# Patient Record
Sex: FEMALE | Race: WHITE | Employment: FULL TIME | URBAN - METROPOLITAN AREA
[De-identification: names, ages, dates, MRNs, and addresses within clinical notes are randomized per-mention and may not be internally consistent; named-entity substitution may affect disease eponyms.]

---

## 2023-03-27 ENCOUNTER — OFFICE VISIT (OUTPATIENT)
Dept: OBGYN CLINIC | Age: 43
End: 2023-03-27
Payer: COMMERCIAL

## 2023-03-27 VITALS
SYSTOLIC BLOOD PRESSURE: 128 MMHG | DIASTOLIC BLOOD PRESSURE: 82 MMHG | WEIGHT: 255.6 LBS | BODY MASS INDEX: 40.12 KG/M2 | HEIGHT: 67 IN

## 2023-03-27 DIAGNOSIS — Z80.0 FAMILY HX OF COLON CANCER: ICD-10-CM

## 2023-03-27 DIAGNOSIS — R10.2 PELVIC PAIN: Primary | ICD-10-CM

## 2023-03-27 DIAGNOSIS — K21.00 GASTROESOPHAGEAL REFLUX DISEASE WITH ESOPHAGITIS WITHOUT HEMORRHAGE: ICD-10-CM

## 2023-03-27 DIAGNOSIS — Z12.31 SCREENING MAMMOGRAM FOR BREAST CANCER: ICD-10-CM

## 2023-03-27 DIAGNOSIS — N92.1 METRORRHAGIA: ICD-10-CM

## 2023-03-27 PROCEDURE — 99203 OFFICE O/P NEW LOW 30 MIN: CPT | Performed by: OBSTETRICS & GYNECOLOGY

## 2023-03-27 NOTE — PROGRESS NOTES
Ramón Estevez  is a 38 y.o. female, G0  Patient's last menstrual period was 2023 (approximate). , who is being seen for pelvic pain. Her mother went through menopause at age 44. Pt really does not like to go the doctor. Pt reports about 2 mos ago she started having a sharp shooting pain in pelvis. Pain is intermittent and random. Not related to period/bleeding. Reports after intercourse has the same pain that happens after orgasm. No pain during intercourse. Pt in same sex relationship. She has pressure and inability to make urine at times. She denies hx of uti. She denies change in bowels. Her mother has appendiceal cancer that spread to her bowel. She lived 2 years after dx. She was 56 at diagnosis. Pt has been having heart burn issues. No pelvic imaging. Negative STD testing 2022 per pt. Has not had pap smear in 20+yrs per partner. Has not started screening mammmograms yet. Periods are have been irregular coming q 19-20 days x 9mo. Had thyroid testing and other screening labs last April. HISTORY:    OB History          0    Para   0    Term   0       0    AB   0    Living   0         SAB   0    IAB   0    Ectopic   0    Molar   0    Multiple   0    Live Births   0              GYN History     Cysts on ovaries in past.  No abnl pap or stis. Sexual History:   Social History     Substance and Sexual Activity   Sexual Activity Yes    Partners: Female          has a past medical history of Anxiety and Pilonidal cyst. . Past Surgical History:   Procedure Laterality Date    ANKLE ARTHROSCOPY W/ OPEN REPAIR Bilateral     PILONIDAL CYST EXCISION      WISDOM TOOTH EXTRACTION         Her current meds are:  No current outpatient medications on file. Family hx:  pgm late 42's / pat aunt - late 42's and cousin in late 29's all with breast cancer.     Review of Systems  Neg except hpi     PHYSICAL EXAM:  /82   Ht 5' 7\" (1.702 m)   Wt 255 lb 9.6 oz (115.9 kg)   LMP

## 2023-04-06 DIAGNOSIS — Z12.31 SCREENING MAMMOGRAM FOR BREAST CANCER: ICD-10-CM

## 2023-04-19 DIAGNOSIS — R92.2 INCONCLUSIVE MAMMOGRAM: ICD-10-CM

## 2023-09-12 DIAGNOSIS — R92.8 ABNORMAL MAMMOGRAM: Primary | ICD-10-CM

## 2023-11-08 ENCOUNTER — HOSPITAL ENCOUNTER (OUTPATIENT)
Dept: MRI IMAGING | Age: 43
Discharge: HOME | End: 2023-11-11
Payer: COMMERCIAL

## 2023-11-08 DIAGNOSIS — R20.2 PARESTHESIA OF SADDLE AREA: ICD-10-CM

## 2023-11-08 DIAGNOSIS — M54.16 LUMBAR RADICULOPATHY: ICD-10-CM

## 2023-11-08 PROCEDURE — 72148 MRI LUMBAR SPINE W/O DYE: CPT

## 2023-11-27 ENCOUNTER — OFFICE VISIT (OUTPATIENT)
Dept: NEUROSURGERY | Age: 43
End: 2023-11-27
Payer: COMMERCIAL

## 2023-11-27 VITALS
HEIGHT: 67 IN | HEART RATE: 73 BPM | SYSTOLIC BLOOD PRESSURE: 134 MMHG | BODY MASS INDEX: 37.67 KG/M2 | TEMPERATURE: 96.8 F | WEIGHT: 240 LBS | DIASTOLIC BLOOD PRESSURE: 73 MMHG | OXYGEN SATURATION: 97 %

## 2023-11-27 DIAGNOSIS — E66.9 OBESITY (BMI 30-39.9): ICD-10-CM

## 2023-11-27 DIAGNOSIS — M51.36 DDD (DEGENERATIVE DISC DISEASE), LUMBAR: ICD-10-CM

## 2023-11-27 DIAGNOSIS — M51.26 HNP (HERNIATED NUCLEUS PULPOSUS), LUMBAR: ICD-10-CM

## 2023-11-27 DIAGNOSIS — M54.16 LUMBAR RADICULOPATHY: Primary | ICD-10-CM

## 2023-11-27 DIAGNOSIS — M48.061 LUMBAR FORAMINAL STENOSIS: ICD-10-CM

## 2023-11-27 PROBLEM — M51.369 DDD (DEGENERATIVE DISC DISEASE), LUMBAR: Status: ACTIVE | Noted: 2023-11-27

## 2023-11-27 PROCEDURE — 99204 OFFICE O/P NEW MOD 45 MIN: CPT | Performed by: NEUROLOGICAL SURGERY

## 2023-11-27 RX ORDER — CYCLOBENZAPRINE HCL 10 MG
10 TABLET ORAL 3 TIMES DAILY PRN
COMMUNITY
Start: 2023-11-08

## 2023-11-27 RX ORDER — ESOMEPRAZOLE MAGNESIUM 40 MG/1
CAPSULE, DELAYED RELEASE ORAL
COMMUNITY
Start: 2023-11-26

## 2023-11-27 RX ORDER — LORAZEPAM 0.5 MG/1
0.5 TABLET ORAL NIGHTLY
COMMUNITY
Start: 2023-09-10

## 2023-11-27 RX ORDER — ALPRAZOLAM 0.25 MG/1
1 TABLET ORAL DAILY PRN
COMMUNITY
Start: 2021-04-30 | End: 2023-11-27 | Stop reason: CLARIF

## 2023-11-27 RX ORDER — FAMOTIDINE 40 MG/1
TABLET, FILM COATED ORAL
COMMUNITY
Start: 2023-11-26

## 2023-11-27 RX ORDER — FLUOXETINE HYDROCHLORIDE 20 MG/1
CAPSULE ORAL
COMMUNITY
Start: 2023-11-25

## 2023-11-27 NOTE — PROGRESS NOTES
History of Present Illness    Patient Words: 41 y.o. This patient is a 41 y.o. female who presents today for neurosurgical consultation. Approximately 5 weeks ago she bent over to change the liner in a garbage bag and developed immediate pain in her back and left lower extremity which have persisted. Pain is worse with standing. She can only stand for 10 minutes before she has to sit down. No falls. No incontinence but she does have increased urinary frequency. No recent conservative treatment other than the hip injection for pain. MRI scan of lumbar spine was reviewed with the patient today and does show a left-sided disc protrusion L4-5 causing foraminal narrowing on the left. Degenerative disc disease and several of the discs are noted. Alignment is normal.  No central canal stenosis.     Past Medical History:   Diagnosis Date    Anxiety     Pilonidal cyst         No Known Allergies     Family History   Problem Relation Age of Onset    Endometriosis Mother     Depression Mother     Anxiety Disorder Mother     Thyroid Disease Mother     Cancer Mother         Appendicidal Carcinoma    Heart Disease Father     Endometriosis Sister     Heart Disease Maternal Grandmother     Diabetes Maternal Grandmother     Heart Disease Paternal Grandmother         Social History     Socioeconomic History    Marital status:      Spouse name: Not on file    Number of children: Not on file    Years of education: Not on file    Highest education level: Not on file   Occupational History    Not on file   Tobacco Use    Smoking status: Former     Types: Cigarettes    Smokeless tobacco: Never   Vaping Use    Vaping Use: Never used   Substance and Sexual Activity    Alcohol use: Yes    Drug use: Never    Sexual activity: Yes     Partners: Female   Other Topics Concern    Not on file   Social History Narrative    Not on file     Social Determinants of Health     Financial Resource Strain: Not on file   Food Insecurity:

## 2023-11-29 ENCOUNTER — TELEPHONE (OUTPATIENT)
Dept: NEUROSURGERY | Age: 43
End: 2023-11-29

## 2023-11-29 NOTE — TELEPHONE ENCOUNTER
Pt was seen on Monday 11.27.23. Pt has been referred to PT and PM. Return appt is 1.22.24. Pt would like to know if she could be out of work while going to these appts. She states she can not stand more than 10 mins at time and [employer] will not let her off for these appts. Please advise.

## 2024-01-22 ENCOUNTER — OFFICE VISIT (OUTPATIENT)
Dept: NEUROSURGERY | Age: 44
End: 2024-01-22
Payer: COMMERCIAL

## 2024-01-22 VITALS
WEIGHT: 240.6 LBS | TEMPERATURE: 98 F | OXYGEN SATURATION: 99 % | SYSTOLIC BLOOD PRESSURE: 133 MMHG | HEART RATE: 66 BPM | HEIGHT: 67 IN | DIASTOLIC BLOOD PRESSURE: 81 MMHG | BODY MASS INDEX: 37.76 KG/M2

## 2024-01-22 DIAGNOSIS — E66.9 OBESITY (BMI 30-39.9): ICD-10-CM

## 2024-01-22 DIAGNOSIS — M54.16 LUMBAR RADICULOPATHY: ICD-10-CM

## 2024-01-22 DIAGNOSIS — M51.26 HNP (HERNIATED NUCLEUS PULPOSUS), LUMBAR: Primary | ICD-10-CM

## 2024-01-22 DIAGNOSIS — M48.061 LUMBAR FORAMINAL STENOSIS: ICD-10-CM

## 2024-01-22 DIAGNOSIS — M51.36 DDD (DEGENERATIVE DISC DISEASE), LUMBAR: ICD-10-CM

## 2024-01-22 PROCEDURE — 99213 OFFICE O/P EST LOW 20 MIN: CPT | Performed by: NEUROLOGICAL SURGERY

## 2024-01-22 NOTE — PROGRESS NOTES
History of Present Illness    Patient Words: 43 y.o.     This patient is a 43 y.o. female who presents today for neurosurgical follow-up.  She completed 1 epidural steroid injection with some modest relief and is scheduled for a second 1 this week.  Unfortunately, no one from physical therapy and recontacted her she did not get to start therapy.  She describes pain while sitting on the commode and turning tying her shoes and walking.  The activities of daily living are hard for her at this time.  No bowel bladder dysfunction and no weakness.    Past Medical History:   Diagnosis Date    Anxiety     Pilonidal cyst         No Known Allergies     Family History   Problem Relation Age of Onset    Endometriosis Mother     Depression Mother     Anxiety Disorder Mother     Thyroid Disease Mother     Cancer Mother         Appendicidal Carcinoma    Heart Disease Father     Endometriosis Sister     Heart Disease Maternal Grandmother     Diabetes Maternal Grandmother     Heart Disease Paternal Grandmother         Social History     Socioeconomic History    Marital status:      Spouse name: Not on file    Number of children: Not on file    Years of education: Not on file    Highest education level: Not on file   Occupational History    Not on file   Tobacco Use    Smoking status: Former     Types: Cigarettes    Smokeless tobacco: Never   Vaping Use    Vaping Use: Never used   Substance and Sexual Activity    Alcohol use: Yes    Drug use: Never    Sexual activity: Yes     Partners: Female   Other Topics Concern    Not on file   Social History Narrative    Not on file     Social Determinants of Health     Financial Resource Strain: Not on file   Food Insecurity: Not on file   Transportation Needs: Not on file   Physical Activity: Not on file   Stress: Not on file   Social Connections: Not on file   Intimate Partner Violence: Not on file   Housing Stability: Not on file       Current Outpatient Medications   Medication Sig

## 2024-02-06 NOTE — PROGRESS NOTES
Appointments   Date Time Provider Department Center   2/8/2024  9:00 AM Terry Maguire MD [facility]   2/12/2024  8:00 AM Mckay Armendariz, PT [facility]   2/19/2024  8:00 AM Mckay Armendariz, PT [facility]   2/21/2024  8:00 AM Mckay Armendariz, PT [facility]   2/26/2024  8:45 AM Mckay Armendariz, PT [facility]   2/28/2024  8:00 AM Mckay Armendariz, PT [facility]

## 2024-02-06 NOTE — THERAPY EVALUATION
Post Session Pain Level:      /10  Past Medical History/Comorbidities:   Ms. Syed  has a past medical history of Anxiety and Pilonidal cyst.  Ms. Syed  has a past surgical history that includes Pilonidal cyst excision; Ankle arthroscopy w/ open repair (Bilateral); and [place] tooth extraction.  Social History/Living Environment:   Patient lives with their family    Prior Level of Function/Work/Activity:   Prior Level of Function: Independent      Learning:   Does the patient/guardian have any barriers to learning?: No barriers  Will there be a co-learner?: No  Does the co-learner have any barriers to learning?: No barriers  What is the preferred language of the patient/guardian?: English  What is the preferred language of the co-learner?: English  Is an  required?: No  How does the patient/guardian prefer to learn new concepts?: Listening; Demonstration  How does the co-learner prefer to learn new concepts?: Demonstration; Listening    Fall Risk Scale:   Solomon Total Score: 0        OBJECTIVE     NATURE OF CONDITION Date: 2/7/24 Date:    Highest level of pain 10    Lowest level of pain 6    Aggravating factors Forward flexion/standing prolonged times    Alleviating factors rest    Frequency of symptoms Everyday     Description of symptoms Sharp shooting/numbness    Location of tenderness LLE radicular       RANGE OF MOTION Date: 2/7/24 Date:      RIGHT LEFT RIGHT LEFT   Lumbar Flexion WNL    Lumbar Extension WNL    Lumbar Lateral Flexion WNL WNL     Lumbar Rotation WNL WNL     Hip Flexion WNL WNL     Hip Extension WNL WNL       STRENGTH Date: 2/7/24 Date:      RIGHT LEFT RIGHT LEFT   Hip Flexion 4-/5 3-/5     Hip Extension 4-/5 3-/5     Hip Abduction 4-/5 3-/5     Knee Extension 4-/5 3-/5     Knee Flexion 4-/5 3-/5     Ankle Dorsiflexion 4-/5 3-/5     Ankle Plantarflexion 4-/5 3-/5       NEURO SCREEN Date: 2/7/24 Date:    Dermatomes Diminished light touch LLE throughout     Reflexes WNL

## 2024-02-07 ENCOUNTER — HOSPITAL ENCOUNTER (OUTPATIENT)
Dept: PHYSICAL THERAPY | Age: 44
Setting detail: RECURRING SERIES
Discharge: HOME | End: 2024-02-10
Attending: NEUROLOGICAL SURGERY
Payer: COMMERCIAL

## 2024-02-07 DIAGNOSIS — M54.51 VERTEBROGENIC LOW BACK PAIN: ICD-10-CM

## 2024-02-07 DIAGNOSIS — M54.59 OTHER LOW BACK PAIN: Primary | ICD-10-CM

## 2024-02-07 PROCEDURE — 97162 PT EVAL MOD COMPLEX 30 MIN: CPT

## 2024-02-07 PROCEDURE — 97112 NEUROMUSCULAR REEDUCATION: CPT

## 2024-02-07 PROCEDURE — 97110 THERAPEUTIC EXERCISES: CPT

## 2024-02-08 ENCOUNTER — OFFICE VISIT (OUTPATIENT)
Dept: NEUROSURGERY | Age: 44
End: 2024-02-08
Payer: COMMERCIAL

## 2024-02-08 VITALS
DIASTOLIC BLOOD PRESSURE: 76 MMHG | OXYGEN SATURATION: 98 % | BODY MASS INDEX: 38.27 KG/M2 | HEART RATE: 67 BPM | TEMPERATURE: 96.8 F | SYSTOLIC BLOOD PRESSURE: 138 MMHG | HEIGHT: 67 IN | WEIGHT: 243.8 LBS

## 2024-02-08 DIAGNOSIS — M48.061 LUMBAR FORAMINAL STENOSIS: ICD-10-CM

## 2024-02-08 DIAGNOSIS — M51.26 HNP (HERNIATED NUCLEUS PULPOSUS), LUMBAR: Primary | ICD-10-CM

## 2024-02-08 DIAGNOSIS — M51.36 DDD (DEGENERATIVE DISC DISEASE), LUMBAR: ICD-10-CM

## 2024-02-08 DIAGNOSIS — E66.9 OBESITY (BMI 30-39.9): ICD-10-CM

## 2024-02-08 DIAGNOSIS — M54.16 LUMBAR RADICULOPATHY: ICD-10-CM

## 2024-02-08 PROCEDURE — 99213 OFFICE O/P EST LOW 20 MIN: CPT | Performed by: NEUROLOGICAL SURGERY

## 2024-02-08 NOTE — PROGRESS NOTES
examined at the 4-week interval here in the office to be certain that we are making progress.      JASON FNUEZ MD

## 2024-02-12 ENCOUNTER — HOSPITAL ENCOUNTER (OUTPATIENT)
Dept: PHYSICAL THERAPY | Age: 44
Setting detail: RECURRING SERIES
Discharge: HOME | End: 2024-02-15
Attending: NEUROLOGICAL SURGERY
Payer: COMMERCIAL

## 2024-02-12 PROCEDURE — 97112 NEUROMUSCULAR REEDUCATION: CPT

## 2024-02-12 PROCEDURE — 97110 THERAPEUTIC EXERCISES: CPT

## 2024-02-12 NOTE — PROGRESS NOTES
Angelina Syed  : 1980  Primary: Junior Yanez (Niesha SCHREIBER)  Secondary:  [facility] @ [place] [address]  [place] [address]  Phone: [phone]  Fax: [phone] Plan Frequency: 2x a week for up to 90 days  Plan of Care/Certification Expiration Date: 24        Plan of Care/Certification Expiration Date:  Plan of Care/Certification Expiration Date: 24    Frequency/Duration: Plan Frequency: 2x a week for up to 90 days      Time In/Out:   Time In: 0800  Time Out: 0840      PT Visit Info:    Total # of Visits to Date: 2  Progress Note Counter: 2      Visit Count:  2    OUTPATIENT PHYSICAL THERAPY:   Treatment Note 2024       Episode  (LBP)               Treatment Diagnosis:    Other low back pain  Vertebrogenic low back pain  Medical/Referring Diagnosis:    HNP (herniated nucleus pulposus), lumbar  DDD (degenerative disc disease), lumbar  Obesity (BMI 30-39.9)  Lumbar radiculopathy  Lumbar foraminal stenosis      Referring Physician:  Terry Maguire MD MD Orders:  PT Eval and Treat   Return MD Appt:     Date of Onset:  No data recorded   Allergies:   Patient has no known allergies.  Restrictions/Precautions:   None      Interventions Planned (Treatment may consist of any combination of the following):     See Assessment Note    Subjective Comments:   Pt reports more radicular pain on the LLE ankle/foot      Initial Pain Level::      /10  Post Session Pain Level:        /10  Medications Last Reviewed:  2024  Updated Objective Findings:  See Evaluation Note from today  Treatment   THERAPEUTIC EXERCISE: (12 minutes):    Exercises per grid below to improve mobility, strength, balance and coordination.  Required no visual, verbal, manual and tactile cues to promote proper body alignment, promote proper body posture, promote proper body mechanics and promote proper body breathing techniques.  Progressed resistance, range, repetitions and complexity of

## 2024-02-19 ENCOUNTER — APPOINTMENT (OUTPATIENT)
Dept: PHYSICAL THERAPY | Age: 44
End: 2024-02-19
Attending: NEUROLOGICAL SURGERY
Payer: COMMERCIAL

## 2024-02-21 ENCOUNTER — HOSPITAL ENCOUNTER (OUTPATIENT)
Dept: PHYSICAL THERAPY | Age: 44
Setting detail: RECURRING SERIES
Discharge: HOME | End: 2024-02-24
Attending: NEUROLOGICAL SURGERY
Payer: COMMERCIAL

## 2024-02-21 PROCEDURE — 97110 THERAPEUTIC EXERCISES: CPT

## 2024-02-21 PROCEDURE — 97112 NEUROMUSCULAR REEDUCATION: CPT

## 2024-02-21 NOTE — PROGRESS NOTES
Angelina Syed  : 1980  Primary: Junior Yanez (Niesha SCHREIBER)  Secondary:  [facility] @ [place] [address]  [place] [address]  Phone: [phone]  Fax: [phone] Plan Frequency: 2x a week for up to 90 days  Plan of Care/Certification Expiration Date: 24        Plan of Care/Certification Expiration Date:  Plan of Care/Certification Expiration Date: 24    Frequency/Duration: Plan Frequency: 2x a week for up to 90 days      Time In/Out:   Time In: 0801  Time Out: 0841      PT Visit Info:    Total # of Visits to Date: 3  Progress Note Counter: 3      Visit Count:  3    OUTPATIENT PHYSICAL THERAPY:   Treatment Note 2024       Episode  (LBP)               Treatment Diagnosis:    Other low back pain  Vertebrogenic low back pain  Medical/Referring Diagnosis:    HNP (herniated nucleus pulposus), lumbar  DDD (degenerative disc disease), lumbar  Obesity (BMI 30-39.9)  Lumbar radiculopathy  Lumbar foraminal stenosis      Referring Physician:  Terry Maguire MD MD Orders:  PT Eval and Treat   Return MD Appt:     Date of Onset:  No data recorded   Allergies:   Patient has no known allergies.  Restrictions/Precautions:   None      Interventions Planned (Treatment may consist of any combination of the following):     See Assessment Note    Subjective Comments:   Pt reports more radicular down the LLE some slight improvement    Initial Pain Level::      /10  Post Session Pain Level:        /10  Medications Last Reviewed:  2024  Updated Objective Findings:  See Evaluation Note from today  Treatment   THERAPEUTIC EXERCISE: (12 minutes):    Exercises per grid below to improve mobility, strength, balance and coordination.  Required no visual, verbal, manual and tactile cues to promote proper body alignment, promote proper body posture, promote proper body mechanics and promote proper body breathing techniques.  Progressed resistance, range, repetitions and

## 2024-02-28 ENCOUNTER — HOSPITAL ENCOUNTER (OUTPATIENT)
Dept: PHYSICAL THERAPY | Age: 44
Setting detail: RECURRING SERIES
Discharge: HOME | End: 2024-03-02
Attending: NEUROLOGICAL SURGERY
Payer: COMMERCIAL

## 2024-02-28 PROCEDURE — 97110 THERAPEUTIC EXERCISES: CPT

## 2024-02-28 PROCEDURE — 97112 NEUROMUSCULAR REEDUCATION: CPT

## 2024-02-28 NOTE — PROGRESS NOTES
Angelina Syed  : 1980  Primary: Junior Yanez (Niesha SCHREIBER)  Secondary:  [facility] @ [place] [address]  [place] [address]  Phone: [phone]  Fax: [phone] Plan Frequency: 2x a week for up to 90 days  Plan of Care/Certification Expiration Date: 24        Plan of Care/Certification Expiration Date:  Plan of Care/Certification Expiration Date: 24    Frequency/Duration: Plan Frequency: 2x a week for up to 90 days      Time In/Out:   Time In: 0800  Time Out: 0842      PT Visit Info:    Total # of Visits to Date: 4  Progress Note Counter: 4  No Show: 1      Visit Count:  4    OUTPATIENT PHYSICAL THERAPY:   Treatment Note 2024       Episode  (LBP)               Treatment Diagnosis:    Other low back pain  Vertebrogenic low back pain  Medical/Referring Diagnosis:    HNP (herniated nucleus pulposus), lumbar  DDD (degenerative disc disease), lumbar  Obesity (BMI 30-39.9)  Lumbar radiculopathy  Lumbar foraminal stenosis      Referring Physician:  Terry Maguire MD MD Orders:  PT Eval and Treat   Return MD Appt:     Date of Onset:  No data recorded   Allergies:   Patient has no known allergies.  Restrictions/Precautions:   None      Interventions Planned (Treatment may consist of any combination of the following):     See Assessment Note    Subjective Comments:   Pt reports having an emergency allergic reaction event earlier this week which made some of her pain worse    Initial Pain Level::      /10  Post Session Pain Level:        /10  Medications Last Reviewed:  2024  Updated Objective Findings:  See Evaluation Note from today  Treatment   THERAPEUTIC EXERCISE: (12 minutes):    Exercises per grid below to improve mobility, strength, balance and coordination.  Required no visual, verbal, manual and tactile cues to promote proper body alignment, promote proper body posture, promote proper body mechanics and promote proper body breathing

## 2024-03-06 ENCOUNTER — TELEPHONE (OUTPATIENT)
Dept: NEUROSURGERY | Age: 44
End: 2024-03-06

## 2024-03-07 ENCOUNTER — OFFICE VISIT (OUTPATIENT)
Dept: NEUROSURGERY | Age: 44
End: 2024-03-07
Payer: COMMERCIAL

## 2024-03-07 VITALS
TEMPERATURE: 97.2 F | WEIGHT: 250.2 LBS | DIASTOLIC BLOOD PRESSURE: 80 MMHG | HEART RATE: 65 BPM | HEIGHT: 67 IN | OXYGEN SATURATION: 96 % | BODY MASS INDEX: 39.27 KG/M2 | SYSTOLIC BLOOD PRESSURE: 135 MMHG

## 2024-03-07 DIAGNOSIS — M51.36 DDD (DEGENERATIVE DISC DISEASE), LUMBAR: Primary | ICD-10-CM

## 2024-03-07 DIAGNOSIS — M48.061 LUMBAR FORAMINAL STENOSIS: ICD-10-CM

## 2024-03-07 DIAGNOSIS — M54.16 LUMBAR RADICULOPATHY: ICD-10-CM

## 2024-03-07 DIAGNOSIS — M51.26 HNP (HERNIATED NUCLEUS PULPOSUS), LUMBAR: ICD-10-CM

## 2024-03-07 PROCEDURE — 99213 OFFICE O/P EST LOW 20 MIN: CPT | Performed by: NEUROLOGICAL SURGERY

## 2024-03-07 RX ORDER — GABAPENTIN 300 MG/1
300 CAPSULE ORAL 3 TIMES DAILY
COMMUNITY
Start: 2024-02-19

## 2024-03-07 NOTE — PROGRESS NOTES
by mouth 3 times daily.      Fluticasone Propionate (FLONASE NA) by Nasal route      esomeprazole (NEXIUM) 40 MG delayed release capsule       famotidine (PEPCID) 40 MG tablet       FLUoxetine (PROZAC) 20 MG capsule       LORazepam (ATIVAN) 0.5 MG tablet Take 1 tablet by mouth nightly. at bedtime.       No current facility-administered medications for this visit.       Patient Active Problem List   Diagnosis    Obesity (BMI 30-39.9)    Lumbar radiculopathy    HNP (herniated nucleus pulposus), lumbar    DDD (degenerative disc disease), lumbar    Lumbar foraminal stenosis        Review of Systems: A complete ROS was done and as stated in the HPI or otherwise negative.    /80   Pulse 65   Temp 97.2 °F (36.2 °C) (Temporal)   Ht 1.702 m (5' 7\")   Wt 113.5 kg (250 lb 3.2 oz)   SpO2 96%   BMI 39.19 kg/m²        Physical Exam  The physical exam findings are as follows:      Cranial Nerves:   Intact visual fields. EMIL, EOM's full, no nystagmus, no ptosis. Facial sensation is normal. Corneal reflexes are intact. Facial movement is symmetric. Hearing is normal bilaterally. Palate is midline with normal sternocleidomastoid and trapezius muscles are normal. Tongue is midline.   Motor:  5/5 strength in upper and lower proximal and distal muscles.  Except 4/5 left EHL weakness normal bulk and tone. No fasciculations.   Reflexes:   Deep tendon reflexes 2+/4 and symmetrical.   Sensory:   Normal to touch, pinprick    Gait:  Normal gait.  Antalgic   Tremor:   No tremor noted.   Cerebellar:  No cerebellar signs present.              Assessment & Plan      ICD-10-CM    1. DDD (degenerative disc disease), lumbar  M51.36       2. HNP (herniated nucleus pulposus), lumbar  M51.26       3. Lumbar foraminal stenosis  M48.061       4. Lumbar radiculopathy  M54.16          Currently we are making progress and we need to stay the course.  Continue physical therapy for 2 times a week for 6 weeks and follow-up in 6 weeks for

## 2024-03-13 ENCOUNTER — HOSPITAL ENCOUNTER (OUTPATIENT)
Dept: PHYSICAL THERAPY | Age: 44
Setting detail: RECURRING SERIES
Discharge: HOME | End: 2024-03-16
Attending: NEUROLOGICAL SURGERY
Payer: COMMERCIAL

## 2024-03-13 PROCEDURE — 97110 THERAPEUTIC EXERCISES: CPT

## 2024-03-13 PROCEDURE — 97112 NEUROMUSCULAR REEDUCATION: CPT

## 2024-03-13 NOTE — PROGRESS NOTES
throughout AROM 10 x 10 sec holds, cues for posterior pelvic tilt throughout AROM     Clamshells cues for BLE hip ER with forward stacking   X 30 GTB X 30 GTB X 30 GTB X 30 GTB   Supine lateral distraction grades 2-3   3 mins  3 mins  3 mins  3 mins    Prone long axis distraction, grades 2-3, with lumbosacral belt    3 mins  3 mins  3 mins    Supine low intensity low duration holds with BP cuff sustained pressure 40 mmHg, posterior pelvic tilt    10 x 10 sec holds at 35-45mm Hg  10 x 10 sec holds at 35-45mm Hg  10 x 10 sec holds at 35-45mm Hg       Treatment/Session Summary:    Treatment Assessment:   Pt tolerated tx well, pt still presents with lumbar radicular pain that does LLE. Pt presents with more LLE weakness. Pt did respond well to long axis distraction, which does reduce her s/s. Will progress     Communication/Consultation:  None today  Equipment provided today:  None  Recommendations/Intent for next treatment session: Next visit will focus on neutral spine posture strength, repeated lumbar extension education and BLE strengthening .    >Total Treatment Billable Duration:  41 minutes   Time In: 0845  Time Out: 0928    Mckay Armendariz PT         Charge Capture  [employer] Portal  Appt Desk     Future Appointments   Date Time Provider Department Center   3/18/2024  9:30 AM Mckay Armendariz, PT [facility]   4/9/2024  8:45 AM Mckay Armendariz, PT [facility]   4/11/2024  8:45 AM Mckay Armendariz, PT [facility]   4/18/2024  8:45 AM Terry Maguire MD [facility]

## 2024-03-20 ENCOUNTER — APPOINTMENT (OUTPATIENT)
Dept: PHYSICAL THERAPY | Age: 44
End: 2024-03-20
Attending: NEUROLOGICAL SURGERY
Payer: COMMERCIAL

## 2024-04-11 ENCOUNTER — HOSPITAL ENCOUNTER (OUTPATIENT)
Dept: PHYSICAL THERAPY | Age: 44
Setting detail: RECURRING SERIES
Discharge: HOME | End: 2024-04-14
Attending: NEUROLOGICAL SURGERY
Payer: COMMERCIAL

## 2024-04-11 ENCOUNTER — TELEPHONE (OUTPATIENT)
Dept: NEUROSURGERY | Age: 44
End: 2024-04-11

## 2024-04-11 PROCEDURE — 97112 NEUROMUSCULAR REEDUCATION: CPT

## 2024-04-11 PROCEDURE — 97110 THERAPEUTIC EXERCISES: CPT

## 2024-04-11 ASSESSMENT — PAIN SCALES - GENERAL: PAINLEVEL_OUTOF10: 6

## 2024-04-11 NOTE — PROGRESS NOTES
distraction grades 2-3  3 mins  3 mins  3 mins    Prone long axis distraction, grades 2-3, with lumbosacral belt  3 mins  3 mins  2 mins    Supine low intensity low duration holds with BP cuff sustained pressure 40 mmHg, posterior pelvic tilt  10 x 10 sec holds at 35-45mm Hg  10 x 10 sec holds at 35-45mm Hg 10 x 10 sec holds at 35-45mm Hg   SLR Hamstring stretch    2 x 60 secs holds    Supine figure 4 stretch    2 x 60 secs    Supine iron cross stretch    X 30 BLE        Treatment/Session Summary:    Treatment Assessment:   Pt tolerated tx fairly well, pt does present with incessant LLE radicular pain. Upon PT special tests, pt looks to have more SIJ involvement than true lumbar pathology due to the ineffectiveness of positional relief and movement. Manual palpation reciprocated positive long dorsal sacral ligament testing, piriformis testing and long axis distraction relief was positive. Pt sees MD Maguire for follow up, pt was educated to discuss with MD about other involvement such as SIJ dysfunction or pirformis pain syndrome. Will progress prn     Communication/Consultation:  None today  Equipment provided today:  None  Recommendations/Intent for next treatment session: Next visit will focus on neutral spine posture strength, repeated lumbar extension education and BLE strengthening .    >Total Treatment Billable Duration:  42 minutes   Time In: 0845  Time Out: 0929    Mckay Armendariz PT         Charge Capture  [employer] Portal  Appt Desk     Future Appointments   Date Time Provider Department Center   4/23/2024  9:00 AM eTrry Maguire MD [facility]   4/24/2024  9:30 AM Mckay Armendariz, PT [facility]   5/1/2024  9:30 AM Mckay Armendariz PT [facility]

## 2024-04-11 NOTE — TELEPHONE ENCOUNTER
LVM to let pt know we have rx appt to 4/23 and this also may be subject to change. Left office number if this did not work for pt.

## 2024-04-18 ENCOUNTER — TELEPHONE (OUTPATIENT)
Dept: NEUROSURGERY | Age: 44
End: 2024-04-18

## 2024-04-18 NOTE — TELEPHONE ENCOUNTER
Dr. Maguire will be out of the office for the next several weeks.     Patient's appt has been rescheduled from 4/22 to 5/9 @ 2:30 with Dr. Maguire.    Detailed vm left for patient notifying of the above.

## 2024-04-24 ENCOUNTER — APPOINTMENT (OUTPATIENT)
Dept: PHYSICAL THERAPY | Age: 44
End: 2024-04-24
Attending: NEUROLOGICAL SURGERY
Payer: COMMERCIAL

## 2024-06-24 ENCOUNTER — OFFICE VISIT (OUTPATIENT)
Dept: NEUROSURGERY | Age: 44
End: 2024-06-24
Payer: COMMERCIAL

## 2024-06-24 VITALS
BODY MASS INDEX: 41.47 KG/M2 | TEMPERATURE: 97.5 F | OXYGEN SATURATION: 95 % | WEIGHT: 264.2 LBS | SYSTOLIC BLOOD PRESSURE: 127 MMHG | HEART RATE: 70 BPM | HEIGHT: 67 IN | DIASTOLIC BLOOD PRESSURE: 77 MMHG

## 2024-06-24 DIAGNOSIS — M51.36 DDD (DEGENERATIVE DISC DISEASE), LUMBAR: ICD-10-CM

## 2024-06-24 DIAGNOSIS — M54.16 LUMBAR RADICULOPATHY: Primary | ICD-10-CM

## 2024-06-24 DIAGNOSIS — M48.061 LUMBAR FORAMINAL STENOSIS: ICD-10-CM

## 2024-06-24 PROCEDURE — 99213 OFFICE O/P EST LOW 20 MIN: CPT | Performed by: NURSE PRACTITIONER

## 2024-06-24 RX ORDER — GABAPENTIN 300 MG/1
300 CAPSULE ORAL 3 TIMES DAILY
Qty: 90 CAPSULE | Refills: 5 | Status: SHIPPED | OUTPATIENT
Start: 2024-06-24 | End: 2024-07-24

## 2024-06-24 NOTE — PROGRESS NOTES
[facility] NOTE:   History of Present Illness:    CC: Follow-up after physical therapy    Angelina Syed is a 44 y.o. female here to follow-up after completing her lumbar physical therapy.  Patient has previously seen Dr. Maguire concerning her left leg radicular symptoms.  The lumbar MRI reveals a left L4-5 disc bulge causing foraminal stenosis and nerve impingement.  Patient had an epidural steroid injection that actually ended up making her symptoms much more intense.  Patient experienced some symptom improvement with physical therapy but states that she still has a lot of pain that is interfering with her life.  Patient states that she is unable to stand for long periods of time without pain.  Patient states she feels a lot of pain in the outer left hip that radiates down the leg.  Patient states she is unable to do stairs without extreme pain and she gets shooting pains in the hip.  Patient states she also notices that she walks with a limp.  Her pain does seem to be worse at night.  Patient states she is currently taking gabapentin 300 mg 3 times daily to try and help with her symptoms.    Past Medical History:   Diagnosis Date    Anxiety     Pilonidal cyst       Past Surgical History:   Procedure Laterality Date    ANKLE ARTHROSCOPY W/ OPEN REPAIR Bilateral     PILONIDAL CYST EXCISION      WISDOM TOOTH EXTRACTION       No Known Allergies   Family History   Problem Relation Age of Onset    Endometriosis Mother     Depression Mother     Anxiety Disorder Mother     Thyroid Disease Mother     Cancer Mother         Appendicidal Carcinoma    Heart Disease Father     Endometriosis Sister     Heart Disease Maternal Grandmother     Diabetes Maternal Grandmother     Heart Disease Paternal Grandmother       Social History     Socioeconomic History    Marital status:      Spouse name: Not on file    Number of children: Not on file    Years of education: Not on file    Highest

## 2024-07-02 ENCOUNTER — TELEPHONE (OUTPATIENT)
Dept: NEUROSURGERY | Age: 44
End: 2024-07-02

## 2024-07-02 NOTE — TELEPHONE ENCOUNTER
Advised patient it might take up to 2 days to obtain faxed [employer] paper work thru our [employer] system

## 2024-07-24 ENCOUNTER — OFFICE VISIT (OUTPATIENT)
Dept: NEUROSURGERY | Age: 44
End: 2024-07-24
Payer: COMMERCIAL

## 2024-07-24 VITALS
BODY MASS INDEX: 41.04 KG/M2 | HEART RATE: 72 BPM | TEMPERATURE: 97.3 F | OXYGEN SATURATION: 95 % | DIASTOLIC BLOOD PRESSURE: 74 MMHG | SYSTOLIC BLOOD PRESSURE: 126 MMHG | WEIGHT: 262 LBS

## 2024-07-24 DIAGNOSIS — M54.16 LUMBAR RADICULOPATHY: Primary | ICD-10-CM

## 2024-07-24 DIAGNOSIS — M48.061 STENOSIS OF LATERAL RECESS OF LUMBAR SPINE: ICD-10-CM

## 2024-07-24 DIAGNOSIS — F17.200 SMOKING: ICD-10-CM

## 2024-07-24 DIAGNOSIS — M51.26 HNP (HERNIATED NUCLEUS PULPOSUS), LUMBAR: ICD-10-CM

## 2024-07-24 PROCEDURE — 99214 OFFICE O/P EST MOD 30 MIN: CPT | Performed by: NEUROLOGICAL SURGERY

## 2024-07-24 NOTE — PROGRESS NOTES
[facility] CLINIC NOTE:   History of Present Illness:    CC: Left lower extremity pain, left lower extremity intermittent numbness and tingling    Angelina Syed is a 44 y.o. female who presents today for evaluation of left lower extremity pain, left lower extremity intermittent numbness and tingling.  The patient has had left lower extremity pain with left lower extremity associated intermittent weakness and intermittent numbness and tingling since November of this year.  She has completed 2 months worth of physical therapy.  She has completed 2 epidural steroid injections with really only receiving approximately 45 minutes worth of pain relief from the epidural steroid injections.  She has undergone 2 months worth of physical therapy.  She has treated her pain with antiepileptics such as Lyrica.  She has previously been evaluated by Dr. Maguire and GANESH Francis.  She states the pain is exacerbated with standing or walking for even up to 15 minutes and fears the pain radiating down her leg the back of the leg and sometimes the dorsum of the foot.  She is accompanied to today's visit by her wife.  She is an everyday smoker and was able to quit prior to the onset of the pain but has resumed smoking.  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 Terry Maguire.  The patient has an MRI lumbar spine without contrast performed at [facility] on 11/8/2023 that demonstrates loss of normal lumbar lordosis with straightening of the lumbar spine at L4-L5 there is a disc herniation that results in subarticular recess compression of the transiting nerve root.  There is no significant neuroforaminal stenosis.  There is some mild facet arthropathy at L5-S1 there is a central disc protrusion that is minimal without any significant compression of the transiting or central nerve roots.  There is

## 2024-07-24 NOTE — H&P (VIEW-ONLY)
opportunity to ask any questions regarding the surgical procedure. The patient and family members present expressed understanding and agree to proceed with surgery.      ICD-10-CM    1. Lumbar radiculopathy  M54.16       2. Stenosis of lateral recess of lumbar spine  M48.061       3. HNP (herniated nucleus pulposus), lumbar  M51.26       4. BMI 40.0-44.9, adult (HCC)  Z68.41       5. Smoking  F17.200           Xavier Chen MD, FAANS, FCNS  Neurosurgeon  [facility]  [facility]     Notes are transcribed with [employer], a medical voice recording dictation service, and may contain minor errors.

## 2024-07-29 ENCOUNTER — PREP FOR PROCEDURE (OUTPATIENT)
Dept: NEUROSURGERY | Age: 44
End: 2024-07-29

## 2024-07-29 DIAGNOSIS — M48.061 STENOSIS OF LATERAL RECESS OF LUMBAR SPINE: ICD-10-CM

## 2024-07-29 DIAGNOSIS — M51.26 HERNIATED NUCLEUS PULPOSUS, LUMBAR: ICD-10-CM

## 2024-08-01 ENCOUNTER — HOSPITAL ENCOUNTER (OUTPATIENT)
Dept: SURGERY | Age: 44
Discharge: HOME | End: 2024-08-01
Payer: COMMERCIAL

## 2024-08-01 VITALS
OXYGEN SATURATION: 94 % | HEART RATE: 62 BPM | RESPIRATION RATE: 16 BRPM | HEIGHT: 67 IN | BODY MASS INDEX: 41.26 KG/M2 | SYSTOLIC BLOOD PRESSURE: 122 MMHG | TEMPERATURE: 97.8 F | WEIGHT: 262.9 LBS | DIASTOLIC BLOOD PRESSURE: 69 MMHG

## 2024-08-01 LAB
MRSA DNA SPEC QL NAA+PROBE: NOT DETECTED
S AUREUS CPE NOSE QL NAA+PROBE: NOT DETECTED

## 2024-08-01 PROCEDURE — 87641 MR-STAPH DNA AMP PROBE: CPT

## 2024-08-01 ASSESSMENT — PAIN DESCRIPTION - ORIENTATION: ORIENTATION: LEFT

## 2024-08-01 ASSESSMENT — PAIN DESCRIPTION - LOCATION: LOCATION: BACK;LEG

## 2024-08-01 ASSESSMENT — PAIN DESCRIPTION - DESCRIPTORS: DESCRIPTORS: BURNING;PINS AND NEEDLES

## 2024-08-01 ASSESSMENT — PAIN SCALES - GENERAL: PAINLEVEL_OUTOF10: 7

## 2024-08-01 NOTE — PERIOP NOTE
Patient verified name, , and surgery as listed in [facility]. Patient provided medical/health information and PTA medications to the best of their ability.    TYPE  CASE: 1B  Orders per surgeon: NOT received.  Labs per surgeon: MRSA/MSSA. Results: pending.  Labs per anesthesia protocol: POC Pregnancy- Held for DOS.  EKG:  Not needed, per anesthesia protocol.     MRSA/MSSA swab collected; pharmacy to review and dose antibiotic as appropriate.     Patient provided with and instructed on education handouts including Guide to Surgery, blood transfusions, pain management, and hand hygiene for the family and community, and [facility] brochure.    Dynahex and instructions given per hospital policy.    Original medication prescription bottles visualized during patient appointment.     Patient teach back successful and patient demonstrates knowledge of instruction.         PLEASE CONTINUE TAKING ALL PRESCRIPTION MEDICATIONS UP TO THE DAY OF SURGERY UNLESS OTHERWISE DIRECTED BELOW. You may take Tylenol, allergy,  and/or indigestion medications.     TAKE ONLY THESE MEDICATIONS ON THE DAY OF SURGERY   Gabapentin   Fluoxetine   Esomeprazole     DISCONTINUE all vitamins and supplements 7 days prior to surgery. DISCONTINUE Non-Steroidal Anti-Inflammatory (NSAIDS) such as Advil and Aleve 5 days prior to surgery.     Home Medications to Hold- please continue all other medications except these.    None        Comments   Shower with Vaishnavi-Hex the night before and morning of surgery.      On the day before surgery (Wednesday) please take 2 Tylenol in the morning and then again before bed. You may use either regular or extra strength.           Please do not bring home medications with you on the day of surgery unless otherwise directed by your nurse.  If you are instructed to bring home medications, please give them to your nurse as they will be administered by the nursing staff.    If you have any questions,

## 2024-08-05 NOTE — PROGRESS NOTES
MD Rich   FLUoxetine (PROZAC) 20 MG capsule Take 1 capsule by mouth daily 11/25/23   ProviderRich MD   LORazepam (ATIVAN) 0.5 MG tablet Take 1 tablet by mouth nightly as needed for Anxiety. at bedtime. 9/10/23   Provider, MD Rich     Allergies   Allergen Reactions    Latex Hives, Itching and Rash          Objective:     Physical Exam:   No data found.    ECG:    No results found for this or any previous visit (from the past 4464 hour(s)).    Data Review:   Labs:   Labs reviewed    Problem List:  )  Patient Active Problem List   Diagnosis    Obesity (BMI 30-39.9)    Lumbar radiculopathy    HNP (herniated nucleus pulposus), lumbar    DDD (degenerative disc disease), lumbar    Lumbar foraminal stenosis    Stenosis of lateral recess of lumbar spine    Herniated nucleus pulposus, lumbar       Surgery Pre-Assessment Recommendations:           No interventions required at this time.     Signed By: Venessa Hughes, APRN - CNP-C    August 5, 2024

## 2024-08-07 ENCOUNTER — TELEPHONE (OUTPATIENT)
Dept: NEUROSURGERY | Age: 44
End: 2024-08-07

## 2024-08-07 NOTE — TELEPHONE ENCOUNTER
Received vm on nurses line- difficult to understand due to language barrier-  [place] Auth Dept for CPT 08096 until 8-16  Message states needs auth to call # [id number]  Sent to Ann

## 2024-08-12 NOTE — TELEPHONE ENCOUNTER
Called back to let them know surgery had been approved, gave them the auth# and also advised all of this information could have been found in her referral.

## 2024-08-15 ENCOUNTER — ANESTHESIA EVENT (OUTPATIENT)
Dept: SURGERY | Age: 44
End: 2024-08-15
Payer: COMMERCIAL

## 2024-08-15 ENCOUNTER — ANESTHESIA (OUTPATIENT)
Dept: SURGERY | Age: 44
End: 2024-08-15
Payer: COMMERCIAL

## 2024-08-15 ENCOUNTER — HOSPITAL ENCOUNTER (OUTPATIENT)
Age: 44
Setting detail: OUTPATIENT SURGERY
Discharge: HOME | End: 2024-08-15
Attending: NEUROLOGICAL SURGERY | Admitting: NEUROLOGICAL SURGERY
Payer: COMMERCIAL

## 2024-08-15 ENCOUNTER — APPOINTMENT (OUTPATIENT)
Dept: GENERAL RADIOLOGY | Age: 44
End: 2024-08-15
Attending: NEUROLOGICAL SURGERY
Payer: COMMERCIAL

## 2024-08-15 VITALS
BODY MASS INDEX: 40.93 KG/M2 | TEMPERATURE: 98 F | WEIGHT: 260.8 LBS | HEIGHT: 67 IN | OXYGEN SATURATION: 95 % | DIASTOLIC BLOOD PRESSURE: 58 MMHG | RESPIRATION RATE: 15 BRPM | HEART RATE: 58 BPM | SYSTOLIC BLOOD PRESSURE: 114 MMHG

## 2024-08-15 DIAGNOSIS — M48.061 STENOSIS OF LATERAL RECESS OF LUMBAR SPINE: ICD-10-CM

## 2024-08-15 DIAGNOSIS — Z98.890 S/P LUMBAR DISCECTOMY: ICD-10-CM

## 2024-08-15 DIAGNOSIS — M54.16 LUMBAR RADICULOPATHY: Primary | ICD-10-CM

## 2024-08-15 DIAGNOSIS — M51.26 HNP (HERNIATED NUCLEUS PULPOSUS), LUMBAR: ICD-10-CM

## 2024-08-15 LAB
ABO + RH BLD: NORMAL
ANION GAP SERPL CALC-SCNC: 12 MMOL/L (ref 9–18)
BASOPHILS # BLD: 0.1 K/UL (ref 0–0.2)
BASOPHILS NFR BLD: 1 % (ref 0–2)
BLOOD GROUP ANTIBODIES SERPL: NORMAL
BUN SERPL-MCNC: 7 MG/DL (ref 6–23)
CALCIUM SERPL-MCNC: 9 MG/DL (ref 8.8–10.2)
CHLORIDE SERPL-SCNC: 102 MMOL/L (ref 98–107)
CO2 SERPL-SCNC: 23 MMOL/L (ref 20–28)
CREAT SERPL-MCNC: 0.61 MG/DL (ref 0.6–1.1)
DIFFERENTIAL METHOD BLD: NORMAL
EOSINOPHIL # BLD: 0.3 K/UL (ref 0–0.8)
EOSINOPHIL NFR BLD: 3 % (ref 0.5–7.8)
ERYTHROCYTE [DISTWIDTH] IN BLOOD BY AUTOMATED COUNT: 13.6 % (ref 11.9–14.6)
GLUCOSE SERPL-MCNC: 94 MG/DL (ref 70–99)
HCG UR QL: NEGATIVE
HCT VFR BLD AUTO: 44 % (ref 35.8–46.3)
HGB BLD-MCNC: 14.8 G/DL (ref 11.7–15.4)
IMM GRANULOCYTES # BLD AUTO: 0 K/UL (ref 0–0.5)
IMM GRANULOCYTES NFR BLD AUTO: 0 % (ref 0–5)
LYMPHOCYTES # BLD: 3.2 K/UL (ref 0.5–4.6)
LYMPHOCYTES NFR BLD: 31 % (ref 13–44)
MCH RBC QN AUTO: 31.3 PG (ref 26.1–32.9)
MCHC RBC AUTO-ENTMCNC: 33.6 G/DL (ref 31.4–35)
MCV RBC AUTO: 93 FL (ref 82–102)
MONOCYTES # BLD: 0.5 K/UL (ref 0.1–1.3)
MONOCYTES NFR BLD: 5 % (ref 4–12)
NEUTS SEG # BLD: 6.3 K/UL (ref 1.7–8.2)
NEUTS SEG NFR BLD: 60 % (ref 43–78)
NRBC # BLD: 0 K/UL (ref 0–0.2)
PLATELET # BLD AUTO: 319 K/UL (ref 150–450)
PMV BLD AUTO: 9.7 FL (ref 9.4–12.3)
POTASSIUM SERPL-SCNC: 4.2 MMOL/L (ref 3.5–5.1)
RBC # BLD AUTO: 4.73 M/UL (ref 4.05–5.2)
SODIUM SERPL-SCNC: 136 MMOL/L (ref 136–145)
SPECIMEN EXP DATE BLD: NORMAL
WBC # BLD AUTO: 10.4 K/UL (ref 4.3–11.1)

## 2024-08-15 PROCEDURE — 3700000000 HC ANESTHESIA ATTENDED CARE: Performed by: NEUROLOGICAL SURGERY

## 2024-08-15 PROCEDURE — 3600000004 HC SURGERY LEVEL 4 BASE: Performed by: NEUROLOGICAL SURGERY

## 2024-08-15 PROCEDURE — 7100000001 HC PACU RECOVERY - ADDTL 15 MIN: Performed by: NEUROLOGICAL SURGERY

## 2024-08-15 PROCEDURE — 6360000002 HC RX W HCPCS: Performed by: NEUROLOGICAL SURGERY

## 2024-08-15 PROCEDURE — 2720000010 HC SURG SUPPLY STERILE: Performed by: NEUROLOGICAL SURGERY

## 2024-08-15 PROCEDURE — 7100000010 HC PHASE II RECOVERY - FIRST 15 MIN: Performed by: NEUROLOGICAL SURGERY

## 2024-08-15 PROCEDURE — 2580000003 HC RX 258: Performed by: NEUROLOGICAL SURGERY

## 2024-08-15 PROCEDURE — 6360000002 HC RX W HCPCS: Performed by: ANESTHESIOLOGY

## 2024-08-15 PROCEDURE — 86901 BLOOD TYPING SEROLOGIC RH(D): CPT

## 2024-08-15 PROCEDURE — 86900 BLOOD TYPING SEROLOGIC ABO: CPT

## 2024-08-15 PROCEDURE — 80048 BASIC METABOLIC PNL TOTAL CA: CPT

## 2024-08-15 PROCEDURE — 85025 COMPLETE CBC W/AUTO DIFF WBC: CPT

## 2024-08-15 PROCEDURE — 2709999900 HC NON-CHARGEABLE SUPPLY: Performed by: NEUROLOGICAL SURGERY

## 2024-08-15 PROCEDURE — 2580000003 HC RX 258: Performed by: NURSE ANESTHETIST, CERTIFIED REGISTERED

## 2024-08-15 PROCEDURE — 2500000003 HC RX 250 WO HCPCS: Performed by: NURSE ANESTHETIST, CERTIFIED REGISTERED

## 2024-08-15 PROCEDURE — 3700000001 HC ADD 15 MINUTES (ANESTHESIA): Performed by: NEUROLOGICAL SURGERY

## 2024-08-15 PROCEDURE — 6360000002 HC RX W HCPCS: Performed by: NURSE ANESTHETIST, CERTIFIED REGISTERED

## 2024-08-15 PROCEDURE — 6370000000 HC RX 637 (ALT 250 FOR IP): Performed by: ANESTHESIOLOGY

## 2024-08-15 PROCEDURE — 86850 RBC ANTIBODY SCREEN: CPT

## 2024-08-15 PROCEDURE — 81025 URINE PREGNANCY TEST: CPT

## 2024-08-15 PROCEDURE — 6370000000 HC RX 637 (ALT 250 FOR IP): Performed by: NEUROLOGICAL SURGERY

## 2024-08-15 PROCEDURE — 2500000003 HC RX 250 WO HCPCS: Performed by: NEUROLOGICAL SURGERY

## 2024-08-15 PROCEDURE — 3600000014 HC SURGERY LEVEL 4 ADDTL 15MIN: Performed by: NEUROLOGICAL SURGERY

## 2024-08-15 PROCEDURE — 63030 LAMOT DCMPRN NRV RT 1 LMBR: CPT | Performed by: NEUROLOGICAL SURGERY

## 2024-08-15 PROCEDURE — A4217 STERILE WATER/SALINE, 500 ML: HCPCS | Performed by: NEUROLOGICAL SURGERY

## 2024-08-15 PROCEDURE — 7100000000 HC PACU RECOVERY - FIRST 15 MIN: Performed by: NEUROLOGICAL SURGERY

## 2024-08-15 RX ORDER — OXYCODONE HYDROCHLORIDE 5 MG/1
10 TABLET ORAL PRN
Status: COMPLETED | OUTPATIENT
Start: 2024-08-15 | End: 2024-08-15

## 2024-08-15 RX ORDER — SCOLOPAMINE TRANSDERMAL SYSTEM 1 MG/1
1 PATCH, EXTENDED RELEASE TRANSDERMAL ONCE
Status: DISCONTINUED | OUTPATIENT
Start: 2024-08-15 | End: 2024-08-16 | Stop reason: HOSPADM

## 2024-08-15 RX ORDER — SODIUM CHLORIDE 0.9 % (FLUSH) 0.9 %
5-40 SYRINGE (ML) INJECTION EVERY 12 HOURS SCHEDULED
Status: DISCONTINUED | OUTPATIENT
Start: 2024-08-15 | End: 2024-08-16 | Stop reason: HOSPADM

## 2024-08-15 RX ORDER — EPHEDRINE SULFATE 5 MG/ML
INJECTION INTRAVENOUS PRN
Status: DISCONTINUED | OUTPATIENT
Start: 2024-08-15 | End: 2024-08-15 | Stop reason: SDUPTHER

## 2024-08-15 RX ORDER — METHYLPREDNISOLONE ACETATE 40 MG/ML
INJECTION, SUSPENSION INTRA-ARTICULAR; INTRALESIONAL; INTRAMUSCULAR; SOFT TISSUE PRN
Status: DISCONTINUED | OUTPATIENT
Start: 2024-08-15 | End: 2024-08-15 | Stop reason: ALTCHOICE

## 2024-08-15 RX ORDER — HYDROCODONE BITARTRATE AND ACETAMINOPHEN 10; 325 MG/1; MG/1
1 TABLET ORAL EVERY 6 HOURS PRN
Qty: 20 TABLET | Refills: 0 | Status: SHIPPED | OUTPATIENT
Start: 2024-08-15 | End: 2024-08-20

## 2024-08-15 RX ORDER — OXYCODONE HYDROCHLORIDE 5 MG/1
5 TABLET ORAL PRN
Status: COMPLETED | OUTPATIENT
Start: 2024-08-15 | End: 2024-08-15

## 2024-08-15 RX ORDER — ONDANSETRON 2 MG/ML
INJECTION INTRAMUSCULAR; INTRAVENOUS PRN
Status: DISCONTINUED | OUTPATIENT
Start: 2024-08-15 | End: 2024-08-15 | Stop reason: SDUPTHER

## 2024-08-15 RX ORDER — CYCLOBENZAPRINE HCL 10 MG
10 TABLET ORAL 3 TIMES DAILY PRN
Qty: 30 TABLET | Refills: 0 | Status: SHIPPED | OUTPATIENT
Start: 2024-08-15 | End: 2024-08-25

## 2024-08-15 RX ORDER — ROCURONIUM BROMIDE 10 MG/ML
INJECTION, SOLUTION INTRAVENOUS PRN
Status: DISCONTINUED | OUTPATIENT
Start: 2024-08-15 | End: 2024-08-15 | Stop reason: SDUPTHER

## 2024-08-15 RX ORDER — NALOXONE HYDROCHLORIDE 0.4 MG/ML
INJECTION, SOLUTION INTRAMUSCULAR; INTRAVENOUS; SUBCUTANEOUS PRN
Status: DISCONTINUED | OUTPATIENT
Start: 2024-08-15 | End: 2024-08-16 | Stop reason: HOSPADM

## 2024-08-15 RX ORDER — PROCHLORPERAZINE EDISYLATE 5 MG/ML
5 INJECTION INTRAMUSCULAR; INTRAVENOUS
Status: DISCONTINUED | OUTPATIENT
Start: 2024-08-15 | End: 2024-08-16 | Stop reason: HOSPADM

## 2024-08-15 RX ORDER — HYDROMORPHONE HYDROCHLORIDE 2 MG/ML
0.5 INJECTION, SOLUTION INTRAMUSCULAR; INTRAVENOUS; SUBCUTANEOUS EVERY 5 MIN PRN
Status: DISCONTINUED | OUTPATIENT
Start: 2024-08-15 | End: 2024-08-16 | Stop reason: HOSPADM

## 2024-08-15 RX ORDER — PHENYLEPHRINE HYDROCHLORIDE 10 MG/ML
INJECTION INTRAVENOUS PRN
Status: DISCONTINUED | OUTPATIENT
Start: 2024-08-15 | End: 2024-08-15 | Stop reason: SDUPTHER

## 2024-08-15 RX ORDER — FENTANYL CITRATE 50 UG/ML
INJECTION, SOLUTION INTRAMUSCULAR; INTRAVENOUS PRN
Status: DISCONTINUED | OUTPATIENT
Start: 2024-08-15 | End: 2024-08-15 | Stop reason: SDUPTHER

## 2024-08-15 RX ORDER — VANCOMYCIN HYDROCHLORIDE 1 G/20ML
INJECTION, POWDER, LYOPHILIZED, FOR SOLUTION INTRAVENOUS PRN
Status: DISCONTINUED | OUTPATIENT
Start: 2024-08-15 | End: 2024-08-15 | Stop reason: ALTCHOICE

## 2024-08-15 RX ORDER — SODIUM CHLORIDE 0.9 % (FLUSH) 0.9 %
5-40 SYRINGE (ML) INJECTION PRN
Status: DISCONTINUED | OUTPATIENT
Start: 2024-08-15 | End: 2024-08-16 | Stop reason: HOSPADM

## 2024-08-15 RX ORDER — DIPHENHYDRAMINE HYDROCHLORIDE 50 MG/ML
12.5 INJECTION INTRAMUSCULAR; INTRAVENOUS
Status: DISCONTINUED | OUTPATIENT
Start: 2024-08-15 | End: 2024-08-16 | Stop reason: HOSPADM

## 2024-08-15 RX ORDER — BUPIVACAINE HYDROCHLORIDE AND EPINEPHRINE 5; 5 MG/ML; UG/ML
INJECTION, SOLUTION EPIDURAL; INTRACAUDAL; PERINEURAL PRN
Status: DISCONTINUED | OUTPATIENT
Start: 2024-08-15 | End: 2024-08-15 | Stop reason: ALTCHOICE

## 2024-08-15 RX ORDER — ONDANSETRON 4 MG/1
4 TABLET, ORALLY DISINTEGRATING ORAL 3 TIMES DAILY PRN
Qty: 21 TABLET | Refills: 0 | Status: SHIPPED | OUTPATIENT
Start: 2024-08-15

## 2024-08-15 RX ORDER — SODIUM CHLORIDE, SODIUM LACTATE, POTASSIUM CHLORIDE, CALCIUM CHLORIDE 600; 310; 30; 20 MG/100ML; MG/100ML; MG/100ML; MG/100ML
INJECTION, SOLUTION INTRAVENOUS CONTINUOUS
Status: DISCONTINUED | OUTPATIENT
Start: 2024-08-15 | End: 2024-08-16 | Stop reason: HOSPADM

## 2024-08-15 RX ORDER — SODIUM CHLORIDE 9 MG/ML
INJECTION, SOLUTION INTRAVENOUS PRN
Status: DISCONTINUED | OUTPATIENT
Start: 2024-08-15 | End: 2024-08-16 | Stop reason: HOSPADM

## 2024-08-15 RX ORDER — MIDAZOLAM HYDROCHLORIDE 2 MG/2ML
2 INJECTION, SOLUTION INTRAMUSCULAR; INTRAVENOUS ONCE
Status: COMPLETED | OUTPATIENT
Start: 2024-08-15 | End: 2024-08-15

## 2024-08-15 RX ORDER — DEXAMETHASONE SODIUM PHOSPHATE 10 MG/ML
INJECTION INTRAMUSCULAR; INTRAVENOUS PRN
Status: DISCONTINUED | OUTPATIENT
Start: 2024-08-15 | End: 2024-08-15 | Stop reason: SDUPTHER

## 2024-08-15 RX ORDER — SODIUM CHLORIDE, SODIUM LACTATE, POTASSIUM CHLORIDE, CALCIUM CHLORIDE 600; 310; 30; 20 MG/100ML; MG/100ML; MG/100ML; MG/100ML
INJECTION, SOLUTION INTRAVENOUS CONTINUOUS PRN
Status: DISCONTINUED | OUTPATIENT
Start: 2024-08-15 | End: 2024-08-15 | Stop reason: SDUPTHER

## 2024-08-15 RX ORDER — LIDOCAINE HYDROCHLORIDE 20 MG/ML
INJECTION, SOLUTION EPIDURAL; INFILTRATION; INTRACAUDAL; PERINEURAL PRN
Status: DISCONTINUED | OUTPATIENT
Start: 2024-08-15 | End: 2024-08-15 | Stop reason: SDUPTHER

## 2024-08-15 RX ORDER — ONDANSETRON 2 MG/ML
4 INJECTION INTRAMUSCULAR; INTRAVENOUS
Status: DISCONTINUED | OUTPATIENT
Start: 2024-08-15 | End: 2024-08-16 | Stop reason: HOSPADM

## 2024-08-15 RX ORDER — PROPOFOL 10 MG/ML
INJECTION, EMULSION INTRAVENOUS PRN
Status: DISCONTINUED | OUTPATIENT
Start: 2024-08-15 | End: 2024-08-15 | Stop reason: SDUPTHER

## 2024-08-15 RX ADMIN — Medication 2000 MG: at 19:29

## 2024-08-15 RX ADMIN — Medication 3 AMPULE: at 12:48

## 2024-08-15 RX ADMIN — PROPOFOL 200 MG: 10 INJECTION, EMULSION INTRAVENOUS at 19:17

## 2024-08-15 RX ADMIN — EPHEDRINE SULFATE 5 MG: 5 INJECTION INTRAVENOUS at 19:53

## 2024-08-15 RX ADMIN — EPHEDRINE SULFATE 10 MG: 5 INJECTION INTRAVENOUS at 20:03

## 2024-08-15 RX ADMIN — LIDOCAINE HYDROCHLORIDE 40 MG: 20 INJECTION, SOLUTION EPIDURAL; INFILTRATION; INTRACAUDAL; PERINEURAL at 19:17

## 2024-08-15 RX ADMIN — ONDANSETRON 4 MG: 2 INJECTION INTRAMUSCULAR; INTRAVENOUS at 19:31

## 2024-08-15 RX ADMIN — HYDROMORPHONE HYDROCHLORIDE 0.5 MG: 2 INJECTION INTRAMUSCULAR; INTRAVENOUS; SUBCUTANEOUS at 21:01

## 2024-08-15 RX ADMIN — PROPOFOL 30 MG: 10 INJECTION, EMULSION INTRAVENOUS at 20:34

## 2024-08-15 RX ADMIN — EPHEDRINE SULFATE 5 MG: 5 INJECTION INTRAVENOUS at 19:56

## 2024-08-15 RX ADMIN — DEXAMETHASONE SODIUM PHOSPHATE 10 MG: 10 INJECTION INTRAMUSCULAR; INTRAVENOUS at 19:31

## 2024-08-15 RX ADMIN — VANCOMYCIN HYDROCHLORIDE 1500 MG: 10 INJECTION, POWDER, LYOPHILIZED, FOR SOLUTION INTRAVENOUS at 19:28

## 2024-08-15 RX ADMIN — FENTANYL CITRATE 50 MCG: 50 INJECTION, SOLUTION INTRAMUSCULAR; INTRAVENOUS at 19:17

## 2024-08-15 RX ADMIN — SUGAMMADEX 200 MG: 100 INJECTION, SOLUTION INTRAVENOUS at 20:39

## 2024-08-15 RX ADMIN — FENTANYL CITRATE 50 MCG: 50 INJECTION, SOLUTION INTRAMUSCULAR; INTRAVENOUS at 19:45

## 2024-08-15 RX ADMIN — HYDROMORPHONE HYDROCHLORIDE 0.5 MG: 2 INJECTION INTRAMUSCULAR; INTRAVENOUS; SUBCUTANEOUS at 21:06

## 2024-08-15 RX ADMIN — ROCURONIUM BROMIDE 35 MG: 10 INJECTION, SOLUTION INTRAVENOUS at 19:17

## 2024-08-15 RX ADMIN — OXYCODONE HYDROCHLORIDE 10 MG: 5 TABLET ORAL at 21:33

## 2024-08-15 RX ADMIN — SODIUM CHLORIDE, SODIUM LACTATE, POTASSIUM CHLORIDE, AND CALCIUM CHLORIDE: 600; 310; 30; 20 INJECTION, SOLUTION INTRAVENOUS at 19:15

## 2024-08-15 RX ADMIN — PHENYLEPHRINE HYDROCHLORIDE 100 MCG: 10 INJECTION INTRAVENOUS at 20:05

## 2024-08-15 RX ADMIN — ROCURONIUM BROMIDE 15 MG: 10 INJECTION, SOLUTION INTRAVENOUS at 19:38

## 2024-08-15 RX ADMIN — MIDAZOLAM 2 MG: 1 INJECTION INTRAMUSCULAR; INTRAVENOUS at 13:56

## 2024-08-15 ASSESSMENT — PAIN - FUNCTIONAL ASSESSMENT
PAIN_FUNCTIONAL_ASSESSMENT: 0-10

## 2024-08-15 ASSESSMENT — PAIN DESCRIPTION - DESCRIPTORS: DESCRIPTORS: ACHING

## 2024-08-15 ASSESSMENT — PAIN SCALES - GENERAL
PAINLEVEL_OUTOF10: 7
PAINLEVEL_OUTOF10: 7

## 2024-08-15 ASSESSMENT — LIFESTYLE VARIABLES: SMOKING_STATUS: 1

## 2024-08-15 NOTE — INTERVAL H&P NOTE
Update History & Physical    The patient's History and Physical of August 15, 2024 was reviewed with the patient and I examined the patient. There was no change. The surgical site was confirmed by the patient and me.     Plan: The risks, benefits, expected outcome, and alternative to the recommended procedure have been discussed with the patient. Patient understands and wants to proceed with the procedure.     Electronically signed by Xavier Chen MD on 8/15/2024 at 6:47 PM

## 2024-08-15 NOTE — ANESTHESIA PROCEDURE NOTES
Airway  Date/Time: 8/15/2024 7:19 PM  Urgency: elective    Airway not difficult    General Information and Staff    Patient location during procedure: OR  Resident/CRNA: Aneglina Pelaez APRN - CRNA  Performed: resident/CRNA   Performed by: Angelina Pelaez APRN - CRNA  Authorized by: Iraj Guzman MD      Indications and Patient Condition  Indications for airway management: anesthesia  Spontaneous Ventilation: absent  Sedation level: deep  Preoxygenated: yes  Patient position: sniffing  MILS not maintained throughout  Mask difficulty assessment: vent by bag mask    Final Airway Details  Final airway type: endotracheal airway      Successful airway: ETT  Cuffed: yes   Successful intubation technique: direct laryngoscopy  Facilitating devices/methods: intubating stylet  Endotracheal tube insertion site: oral  Blade: Roger  Blade size: #4  ETT size (mm): 7.0  Cormack-Lehane Classification: grade I - full view of glottis  Placement verified by: chest auscultation and capnometry   Measured from: lips  Number of attempts at approach: 1  Ventilation between attempts: bag mask  Number of other approaches attempted: 0    no

## 2024-08-15 NOTE — PROGRESS NOTES
Post-operative pain, muscle relaxant and nausea medicines e-scribed prior to surgery to ensure they could be obtained prior to the pharmacies closing for the evening.     Xavier Chen MD, FAANS, FCNS   Neurosurgeon  [facility]  [facility]   8/15/2024 at 6:46 PM

## 2024-08-15 NOTE — ANESTHESIA PRE PROCEDURE
Department of Anesthesiology  Preprocedure Note       Name:  Angelina Syed   Age:  44 y.o.  :  1980                                          MRN:  [id number]         Date:  8/15/2024      Surgeon: Surgeon(s):  Xavier Chen MD    Procedure: Procedure(s):  minimally invasive left-sided L4-L5 hemilaminectomy medial facetectomy and decompression with possible discectomy for decompression of the transiting L5 nerve root    Medications prior to admission:   Prior to Admission medications    Medication Sig Start Date End Date Taking? Authorizing Provider   esomeprazole (NEXIUM) 40 MG delayed release capsule Take 1 capsule by mouth every morning (before breakfast) 23  Yes Rich Garzon MD   FLUoxetine (PROZAC) 20 MG capsule Take 1 capsule by mouth daily 23  Yes Rich Garzon MD   gabapentin (NEURONTIN) 300 MG capsule Take 1 capsule by mouth 3 times daily for 30 days. 24  Renetta Amin, APRN - CNP   Fluticasone Propionate (FLONASE NA) by Nasal route  Patient not taking: Reported on 2024    Rich Garzon MD   LORazepam (ATIVAN) 0.5 MG tablet Take 1 tablet by mouth nightly as needed for Anxiety. at bedtime. 9/10/23   Rich Garzon MD       Current medications:    No current facility-administered medications for this encounter.       Allergies:    Allergies   Allergen Reactions    Latex Hives, Itching and Rash       Problem List:    Patient Active Problem List   Diagnosis Code    Obesity (BMI 30-39.9) E66.9    Lumbar radiculopathy M54.16    HNP (herniated nucleus pulposus), lumbar M51.26    DDD (degenerative disc disease), lumbar M51.36    Lumbar foraminal stenosis M48.061    Stenosis of lateral recess of lumbar spine M48.061    Herniated nucleus pulposus, lumbar M51.26       Past Medical History:        Diagnosis Date    Anxiety     DVT (deep venous thrombosis) ([facility])     Behind  R knee, per pt    Pilonidal cyst     Pre-diabetes        Past Surgical

## 2024-08-16 NOTE — OP NOTE
appropriately.  The patient was prepped and draped in a sterile fashion. A surgical pause time-out was performed at the beginning of the case prior to incision confirming procedure, sterility and functionality of instruments, surgical site, stability of patient, and anti-biotic administration. The correct disc intra-laminar space of L4-L5 was identified with intra-operative fluoroscopy. A midline lumbar incision was made approximately 3 cm in length to accommodate an 18 mm METRX Tubular retractor. The sequential METRX dilators were then inserted and dilated up docking at the at the left L4-L5 facet, laminar and interlaminar space junction. A 9 cm 18 mm tubular retractor was utilized. Intraoperative C-arm fluoroscopic imaging was used to confirm docking at the appropriate level. At this time the microscope was then pulled in and used throughout the entirety of the case. Soft tissue overlying the lamina, spinous process and medial facet was dissected with electrocautery. The hemilaminectomy and medial facetectomy was performed with high speed drill and Kerrison rongeurs.  A synovial cyst extending from the L4-L5 facet joint medially was also compressive of the transiting nerve root and the thecal sac this was resected away from the thecal sac and the transiting L5 nerve root carefully with microdissectors and Kerrison rongeurs.  The mid-line epidural fat cleft was identified and the hypertrophic ligament was resected. The ligament was resected from the ipsilateral lateral recess ensuring an adequate dorsal decompression of the thecal sac the transiting L5 nerve root on the left.  I then gently retracted the nerve root and thecal sac medially using a nerve root retractor. I coagulated Norman's epidural venus plexus and sharply cut any epidural veins. I then performed the annulotomy sharply. I then used a right angled Flounder head and ball probe to massage out the free fragment that herniated dorsal to the right thecal

## 2024-08-16 NOTE — PERIOP NOTE
Discharge instructions reviewed with pt and family member who verbalize understanding of follow up care.   
26-Jun-2018 11:41

## 2024-08-16 NOTE — ANESTHESIA POSTPROCEDURE EVALUATION
Department of Anesthesiology  Postprocedure Note    Patient: Angelina Syed  MRN: [id number]  YOB: 1980  Date of evaluation: 8/15/2024    Procedure Summary       Date: 08/15/24 Room / Location: [facility] MAIN OR  / [facility] MAIN OR    Anesthesia Start: 1913 Anesthesia Stop: 2050    Procedure: minimally invasive left-sided L4-L5 hemilaminectomy medial facetectomy and decompression with possible discectomy for decompression of the transiting L5 nerve root (Left: Back) Diagnosis:       Lumbar radiculopathy      Stenosis of lateral recess of lumbar spine      Herniated nucleus pulposus, lumbar      (Lumbar radiculopathy [M54.16])      (Stenosis of lateral recess of lumbar spine [M48.061])      (Herniated nucleus pulposus, lumbar [M51.26])    Providers: Xavier Chen MD Responsible Provider: Iraj Guzman MD    Anesthesia Type: General ASA Status: 3            Anesthesia Type: General    Pepe Phase I: Pepe Score: 9    Pepe Phase II:      Anesthesia Post Evaluation    Patient location during evaluation: PACU  Patient participation: complete - patient participated  Level of consciousness: awake and alert  Airway patency: patent  Nausea & Vomiting: no nausea and no vomiting  Cardiovascular status: hemodynamically stable  Respiratory status: acceptable, nonlabored ventilation and spontaneous ventilation  Hydration status: euvolemic  Comments: /65   Pulse 71   Temp 98 °F (36.7 °C) (Tympanic)   Resp 15   Ht 1.702 m (5' 7\")   Wt 118.3 kg (260 lb 12.8 oz)   LMP 07/01/2024 (Exact Date)   SpO2 92%   BMI 40.85 kg/m²     Multimodal analgesia pain management approach  Pain management: adequate and satisfactory to patient        No notable events documented.

## 2024-08-16 NOTE — DISCHARGE INSTRUCTIONS
Postoperative Home Instructions  Lumbar (Back) Surgery    Showering: You may shower the first day you are home with the dressing on.  If the dressing becomes saturated, change it completely to a similar dressing.  Leave the steri-strips or glue in place.  After 3 days, you may take the dressing off and leave it off. If you have the brown aquacel dressing, leave it alone for 5 days and then you may remove the dressing.  Do not soak in a tub, and use only soap and water on the wound.    Wound Care: A small to moderate amount of reddish drainage on the dressing is normal the first 1-2 days after surgery.  If the dressing becomes saturated, change it.  Once the steri-strips begin to peel up on their own, you may gently take them off.  Large amounts of clear, watery drainage is not normal and you should call our office immediately.    Signs of Infection: Extreme tenderness at the wound, excessive redness and/or swelling, or ugly yellowish-greenish drainage from the wound.  Fever greater than 100.5 may be present.  If you think you have a wound infection, call our office immediately.    Driving: You may not begin driving until after your visit to our office for a wound and suture check which is normally 7-10 days after you come home from the hospital.    Medications: You should take anti-inflammatory medications such as Motrin, Celebrex for 30 days after surgery, every day, on a regular schedule only if prescribed by your physician.  The pain medicine prescribed may be taken as needed.  You should take a stool softener (Colace) twice a day, drink lots of water and eat high fiber foods to avoid constipation (this is a common problem with pain medicine.)    Medication Interaction:  During your procedure you potentially received a medication or medications which may reduce the effectiveness of oral contraceptives. Please consider other forms of contraception for 1 month following your procedure if you are currently using

## 2024-08-28 ENCOUNTER — NURSE ONLY (OUTPATIENT)
Dept: NEUROSURGERY | Age: 44
End: 2024-08-28

## 2024-08-28 VITALS
WEIGHT: 267.5 LBS | TEMPERATURE: 97.2 F | OXYGEN SATURATION: 98 % | HEART RATE: 67 BPM | HEIGHT: 67 IN | DIASTOLIC BLOOD PRESSURE: 72 MMHG | BODY MASS INDEX: 41.99 KG/M2 | SYSTOLIC BLOOD PRESSURE: 131 MMHG

## 2024-08-28 DIAGNOSIS — M54.16 LUMBAR RADICULOPATHY: ICD-10-CM

## 2024-08-28 DIAGNOSIS — M51.26 HERNIATED NUCLEUS PULPOSUS, LUMBAR: ICD-10-CM

## 2024-08-28 DIAGNOSIS — M48.061 STENOSIS OF LATERAL RECESS OF LUMBAR SPINE: Primary | ICD-10-CM

## 2024-08-28 RX ORDER — METHYLPREDNISOLONE 4 MG
TABLET, DOSE PACK ORAL
Qty: 1 KIT | Refills: 0 | Status: SHIPPED | OUTPATIENT
Start: 2024-08-28 | End: 2024-09-03

## 2024-08-28 NOTE — PROGRESS NOTES
Subjective: Patient states for the last 4 days she has been having increasing left calf pain- no injury reported but has been walking approx 3 miles a day and has restarted Neurontin; she does state her hip pain is improved    Consent: verbal consent obtained and given by the patient. Risks of removal include bleeding,infection,reopening and excessive scarring.  Objective:  /72 (Site: Left Upper Arm)   Pulse 67   Temp 97.2 °F (36.2 °C) (Temporal)   Ht 1.702 m (5' 7\")   Wt 121.3 kg (267 lb 8 oz)   LMP 07/01/2024 (Exact Date)   SpO2 98%   BMI 41.90 kg/m²   Pain Scale: 5/10    General: patient is cooperative; mood and affect are appropriate; denies any fevers,chills or headache; denies any fatigue or weakness  HEENT:trachea is midline; no voice, visual or swallowing difficulty; good range of motion with neck; mucous membranes are normal  Neuro:alert and oriented; denies any dizziness; gait and coordination observed are normal; ambulates without assistance  Wound: lumbar incision with Exofen intact- blackened in color and a faint red outline allergic reaction from tape; removed Exofen mesh and removed some glue and cleansed with betadine- incision well approx ,no signs of infection and healing   Number of sutures/staples removed:none    Assessment:  Post op check: Patient is here for initial post-op check. Dr. Chen   is the supervising physician in clinic today and approves of today's treatment plan      Plan:  Post-op instructions: strict 2-5 pound weight limit; frequent positional changes; no bending ,lifting or twisting;instructed on proper body mechanics; instructed on short distance driving with considerations of no driving in pain,while on opioids and muscle relaxer's and able to safely check vehicle blind spot  Incisional care: not to saturate and pat dry;do not itch scratch or submerge  Management and expected pain:advised patient of expected nerve swelling and length of time for nerve healing;  has restarted Neurontin 300 mg TD ; medrol dose pack per standing orders and may increase Neurontin to 300/300/600 HS  Post-op complications: patient instructed to observe for infection, deep vein thrombosis,constipation and pneumonia  Therapy/exercises: start a walking program; demonstrated simple leg strengthening exercises  Smoking:  patient has quit smoking  Work status: remains out of work and will continue SANDI estimated 1-6-2025  Return appointment with any follow-up films:Patient demonstrated knowledge of procedure and post op instructions; patient was given the opportunity for questions and clarification was provided

## 2024-08-28 NOTE — PATIENT INSTRUCTIONS
Postoperative Home Instructions Lumbar Surgery    Showering: Do not soak in bathtub, hot tub or swimming pool for 3 weeks. Use only soap and water on the incision and pat it dry. Do not scrub the incision.    Wound Care: A small to moderate amount of reddish drainage on the dressing is normal for the first several days after surgery. Large amounts of clear, watery drainage is not normal and you should call doctors office immediately.  Please call if any drainage noted to wound after suture visit.    Signs of Infection: Please notify your physician for any of the following; a temperature greater than 100.5, extreme tenderness at the wound, excessive redness and/or swelling, or large amounts of drainage.    Driving: You may begin driving after your first visit to the physicians office for a wound check. If you had a lumbar fusion with instrumentation, no driving until directed.    Medication: You can take anti-inflammatory medications such as Motrin or Celebrex for 30 days after surgery only if prescribed by your physician. The pain medication prescribed may be taken as needed. You should take a stool softener twice a day, drink plenty of water and eat high fiber foods to avoid constipation. This is a common problem patients experience while taking pain medication.    Deep Breathing Exercises: Continue to use your incentive spirometer.    Smoking: Smoking will interfere with your healing. If you smoke you may end up having another surgery or more problems.    Lumbar Corsette: If prescribed by your physician, wear your corsette when standing, sitting or walking for a long period of time. You do not have to wear your corsette to sleep at night.    Activity: No heavy lifting. Your weight limit is 2-5 pounds until directed by your physician. Avoid bending, twisting at the waist or stretching above your head where incision is being pulled. You should bend with your knees and keep your back straight if needing to pick  something up off the ground. Sleep on your side or your back, but do not sleep on your stomach. No prolonged sitting. Sitting can aggravate the nerve that is healing in leg. Only sit for periods of about 15-20 minutes at any one time. Alternate between sitting, walking, standing, and lying down. You may do steps and inclines in moderation.    Sexual Relations: You may resume sexual relations 2 weeks after your surgery.     Walking Program: Walking strengthens the spinal muscles and will help protect your discs and vertebrae. Increase your walking program up to two miles per day over the next four weeks. Begin slowly with 1/8 to 1/4 of a mile and add to this each day.     Symptoms after Surgery: Do not be alarmed if you still have some of the same symptoms you had prior to surgery. The nerves often require time to heal after the pressure has been relieved. You may experience pain and symptoms that you did not have prior to surgery such as tingling, numbness and or weakness to lower extremities. If these are bothersome please report to your physician. The level of pain you experience should improve as your body heals. It takes 6-8 weeks to heal from any surgery and we expect you to experience some discomfort possibly for this period of time. You should expect to feel soreness, stiffness and pain especially in the mornings, this should improve somewhat during the day and by the evening you may experience more pain. If you desire you can take your pain medications to relieve these symptoms and use warm moist heat especially at night to the incision site. Please contact the office should you develop any problems urinating.      Follow up: You will be given a follow up appointment after your suture visit. If you have had a lumbar fusion you will be given an order for a lumbar spine x-ray to have done approximately a week prior to return in follow-up.    Please call your physicians office if you have any other questions or

## 2024-09-18 ENCOUNTER — OFFICE VISIT (OUTPATIENT)
Dept: NEUROSURGERY | Age: 44
End: 2024-09-18

## 2024-09-18 VITALS
TEMPERATURE: 97.5 F | SYSTOLIC BLOOD PRESSURE: 114 MMHG | OXYGEN SATURATION: 96 % | HEIGHT: 67 IN | WEIGHT: 264 LBS | BODY MASS INDEX: 41.44 KG/M2 | HEART RATE: 54 BPM | DIASTOLIC BLOOD PRESSURE: 65 MMHG

## 2024-09-18 DIAGNOSIS — Z98.890 STATUS POST LUMBAR SPINE SURGERY FOR DECOMPRESSION OF SPINAL CORD: Primary | ICD-10-CM

## 2024-09-18 PROCEDURE — 99024 POSTOP FOLLOW-UP VISIT: CPT | Performed by: NURSE PRACTITIONER

## 2024-11-06 ENCOUNTER — OFFICE VISIT (OUTPATIENT)
Dept: NEUROSURGERY | Age: 44
End: 2024-11-06
Payer: COMMERCIAL

## 2024-11-06 VITALS
BODY MASS INDEX: 41.59 KG/M2 | TEMPERATURE: 97.9 F | SYSTOLIC BLOOD PRESSURE: 123 MMHG | OXYGEN SATURATION: 96 % | DIASTOLIC BLOOD PRESSURE: 80 MMHG | HEART RATE: 79 BPM | WEIGHT: 265 LBS | HEIGHT: 67 IN

## 2024-11-06 DIAGNOSIS — R21 RASH: ICD-10-CM

## 2024-11-06 DIAGNOSIS — Z98.890 S/P LUMBAR LAMINECTOMY: Primary | ICD-10-CM

## 2024-11-06 PROCEDURE — 99213 OFFICE O/P EST LOW 20 MIN: CPT | Performed by: NURSE PRACTITIONER

## 2024-11-06 RX ORDER — CELECOXIB 100 MG/1
100 CAPSULE ORAL DAILY
Qty: 60 CAPSULE | Refills: 3 | Status: SHIPPED | OUTPATIENT
Start: 2024-11-06

## 2024-11-06 NOTE — PROGRESS NOTES
urination, burning or painful urination, or blood in urine     Musculoskeletal:   POS left buttock pain     Integument:   No low back red bumps with itching     Neurological:   Dizziness/vertigo, No numbness/tingling sensation, tremors, No weakness in limbs, frequent or recurring headaches, memory loss or confusion.       Physical Exam:    General: No acute distress  Head normocephalic and atraumatic  Mood and affect appropriate  CV: Regular rate   Resp: No increased work of breathing  Skin: warm and dry   Awake, alert, and oriented   Speech fluent  Eyes open spontaneously   Face symmetric and tongue midline on protrusion  Sternocleidomastoid and trapezius 5/5  No mid-line cervical, thoracic, or lumbar tenderness to palpation   Patient with strength exam as follows:      Lower Extremities:      Hip Flex 5 5    Quads  5 5    Hamstrings 5 5    Dorsiflex 5 5    Plantarflex 5 5    EHL  5 5  Sensation intact to light touch and pin-prick   DTR 2+  No clonus or babinski present   No Encarnacion's sign present bilaterally   Gait normal    Assessment & Plan:  Angelina Syed is a 44 y.o. female who presents for her 3-month postoperative follow-up appointment.  At this time I am going to send in a prescription for Celebrex instead of the patient taking ibuprofen to see if this does not help with the left buttock pain that she is still experiencing.  I am hopeful that over the next few months she will notice continued healing with that nerve pain in the left buttock region.  Patient will follow-up in 3 months time to discuss those residual symptoms.  Patient is also to follow-up with her primary care doctor about getting a steroid cream to help make her rash fully go away.    A total of 20 minutes was spent in chart review, patient consultation, and documentation.  No diagnosis found.    Notes are transcribed with [employer], a medical voice recording dictation service, and may contain minor errors.    Renetta Amin,

## 2025-02-12 ENCOUNTER — OFFICE VISIT (OUTPATIENT)
Dept: NEUROSURGERY | Age: 45
End: 2025-02-12
Payer: COMMERCIAL

## 2025-02-12 VITALS
BODY MASS INDEX: 40.97 KG/M2 | SYSTOLIC BLOOD PRESSURE: 130 MMHG | TEMPERATURE: 97.9 F | DIASTOLIC BLOOD PRESSURE: 82 MMHG | HEIGHT: 67 IN | WEIGHT: 261 LBS | OXYGEN SATURATION: 98 % | HEART RATE: 80 BPM

## 2025-02-12 DIAGNOSIS — Z98.890 S/P LUMBAR LAMINECTOMY: Primary | ICD-10-CM

## 2025-02-12 PROCEDURE — 99213 OFFICE O/P EST LOW 20 MIN: CPT | Performed by: NEUROLOGICAL SURGERY

## 2025-02-12 NOTE — PROGRESS NOTES
is still feeling much better than prior to surgery.  She is pleased with her progress.  She has returned back to work and is no longer working at [employer] but currently works as a  in the [employer].  At this point she is healing well.  I will see her back on an as-needed basis.      ICD-10-CM    1. S/P lumbar laminectomy  Z98.890         Xavier Chen MD, FAANS, FCNS   Neurosurgeon  [facility]  [facility]   2/12/2025 at 9:22 AM    Notes are transcribed with [employer], a medical voice recording dictation service, and may contain minor errors.

## (undated) DEVICE — APPLICATOR MEDICATED 26 CC SOLUTION HI LT ORNG CHLORAPREP

## (undated) DEVICE — C-ARM: Brand: UNBRANDED

## (undated) DEVICE — GLOVE SURG SZ 75 L12IN FNGR THK79MIL GRN LTX FREE

## (undated) DEVICE — GARMENT,MEDLINE,DVT,INT,CALF,MED, GEN2: Brand: MEDLINE

## (undated) DEVICE — SYRINGE MED 10ML LUERLOCK TIP W/O SFTY DISP

## (undated) DEVICE — CARBIDE MATCH HEAD

## (undated) DEVICE — BLADE ES L6IN ELASTOMERIC COAT EXT DURABLE BEND UPTO 90DEG

## (undated) DEVICE — ABSORBENT, WATERPROOF, BACTERIA PROOF FILM DRESSING: Brand: OPSITE POST OP 9.5X8.5CM CTN 20

## (undated) DEVICE — DRAPE MICSCP DISPOSABLE

## (undated) DEVICE — TUBING, SUCTION, 1/4" X 10', STRAIGHT: Brand: MEDLINE

## (undated) DEVICE — SPINE NEURO: Brand: MEDLINE INDUSTRIES, INC.

## (undated) DEVICE — GLOVE ORANGE PI 7   MSG9070

## (undated) DEVICE — Z DISCONTINUED USE 2220281 SUTURE VICRYL SZ 2-0 L18IN ABSRB UD L26MM CP-2 1/2 CIR REV J762D

## (undated) DEVICE — SPONGE,NEURO,0.5"X0.5",XR,STRL,10/PK: Brand: MEDLINE

## (undated) DEVICE — LIQUIBAND RAPID ADHESIVE 36/CS 0.8ML: Brand: MEDLINE

## (undated) DEVICE — SOLUTION IRRIG 1000ML 0.9% SOD CHL USP POUR PLAS BTL

## (undated) DEVICE — INTENDED FOR TISSUE SEPARATION, AND OTHER PROCEDURES THAT REQUIRE A SHARP SURGICAL BLADE TO PUNCTURE OR CUT.: Brand: BARD-PARKER ® STAINLESS STEEL BLADES

## (undated) DEVICE — SURGIFOAM SPNG SZ 100

## (undated) DEVICE — JACKSON TABLE POSITIONER KIT: Brand: MEDLINE INDUSTRIES, INC.